# Patient Record
Sex: MALE | Race: WHITE | NOT HISPANIC OR LATINO | URBAN - METROPOLITAN AREA
[De-identification: names, ages, dates, MRNs, and addresses within clinical notes are randomized per-mention and may not be internally consistent; named-entity substitution may affect disease eponyms.]

---

## 2024-09-03 ENCOUNTER — APPOINTMENT (OUTPATIENT)
Age: 44
Setting detail: DERMATOLOGY
End: 2024-09-03

## 2024-09-03 DIAGNOSIS — L20.89 OTHER ATOPIC DERMATITIS: ICD-10-CM

## 2024-09-03 DIAGNOSIS — D22 MELANOCYTIC NEVI: ICD-10-CM

## 2024-09-03 PROBLEM — D22.39 MELANOCYTIC NEVI OF OTHER PARTS OF FACE: Status: ACTIVE | Noted: 2024-09-03

## 2024-09-03 PROBLEM — D22.71 MELANOCYTIC NEVI OF RIGHT LOWER LIMB, INCLUDING HIP: Status: ACTIVE | Noted: 2024-09-03

## 2024-09-03 PROCEDURE — 99203 OFFICE O/P NEW LOW 30 MIN: CPT

## 2024-09-03 PROCEDURE — OTHER FOLLOW UP FOR NEXT VISIT: OTHER

## 2024-09-03 PROCEDURE — OTHER COUNSELING: OTHER

## 2024-09-03 PROCEDURE — OTHER REASSURANCE: OTHER

## 2024-09-03 PROCEDURE — OTHER TREATMENT REGIMEN: OTHER

## 2024-09-03 PROCEDURE — OTHER PRESCRIPTION: OTHER

## 2024-09-03 RX ORDER — DOXYCYCLINE HYCLATE 100 MG/1
100 MG CAPSULE, GELATIN COATED ORAL BID
Qty: 14 | Refills: 0 | Status: ERX | COMMUNITY
Start: 2024-09-03

## 2024-09-03 RX ORDER — TRIAMCINOLONE ACETONIDE 1 MG/G
THIN COAT CREAM TOPICAL BID
Qty: 30 | Refills: 1 | Status: ERX | COMMUNITY
Start: 2024-09-03

## 2024-09-03 ASSESSMENT — LOCATION ZONE DERM
LOCATION ZONE: GENITALIA
LOCATION ZONE: FACE
LOCATION ZONE: LEG

## 2024-09-03 ASSESSMENT — LOCATION SIMPLE DESCRIPTION DERM
LOCATION SIMPLE: RIGHT CHEEK
LOCATION SIMPLE: RIGHT THIGH
LOCATION SIMPLE: RIGHT PRETIBIAL REGION
LOCATION SIMPLE: SCROTUM

## 2024-09-03 ASSESSMENT — SEVERITY ASSESSMENT 2020: SEVERITY 2020: SEVERE

## 2024-09-03 ASSESSMENT — LOCATION DETAILED DESCRIPTION DERM
LOCATION DETAILED: RIGHT ANTERIOR DISTAL THIGH
LOCATION DETAILED: RIGHT SCROTUM
LOCATION DETAILED: RIGHT CENTRAL MALAR CHEEK
LOCATION DETAILED: LEFT SCROTUM
LOCATION DETAILED: RIGHT PROXIMAL PRETIBIAL REGION

## 2024-09-03 ASSESSMENT — ITCH NUMERIC RATING SCALE: HOW SEVERE IS YOUR ITCHING?: 7

## 2024-09-03 ASSESSMENT — BSA ECZEMA: % BODY COVERED IN ECZEMA: 1

## 2024-09-03 NOTE — PROCEDURE: FOLLOW UP FOR NEXT VISIT
Detail Level: Zone
Scheduled For Follow Up In (Optional): 2 weeks if not resolved
Scheduled For Follow Up In (Optional): 1 year

## 2024-09-03 NOTE — PROCEDURE: TREATMENT REGIMEN
Detail Level: Simple
Plan: Patient will use the topical steroid twice daily for up to two weeks. He will call or follow up if this is not improving for a biopsy. He will take the oral antibiotic for seven days as there was some evidence of secondary impetiginezation

## 2024-09-03 NOTE — PROCEDURE: REASSURANCE
Additional Note: Patient reassured these are benign, no treatment needed. Essentially these are consistent with benign superficial keratosis. Call or follow up if these change. Otherwise follow up annually.
Hide Additional Notes?: No
Detail Level: Simple

## 2024-09-03 NOTE — HPI: EVALUATION OF SKIN LESION(S)
What Type Of Note Output Would You Prefer (Optional)?: Bullet Format
How Severe Are Your Spot(S)?: moderate
Hpi Title: Evaluation of a Skin Lesion
Additional History: Patient presents for a spot check patient has a few flat brown spots on his right lower leg and face to check to make sure they don’t represent anything dangerous. They are completely asymptomatic. He has no previous history of pre-cancer, skin cancer or melanoma. He’s also complaining of dry, very itchy skin on the scrotum for the past three years. He does have a previous history of some mild atopic or nummular dermatitis that was treated with an unknown topical steroid which did work previously, but he can’t remember the name. No particular pattern to this breakout, it’s fairly consistent throughout the year and seems to never really go away and nothing seems to necessarily make it worse. No other skin surfaces are currently affected.
143